# Patient Record
(demographics unavailable — no encounter records)

---

## 2019-10-18 NOTE — PDOC.LDHP
Labor and Delivery H&P


Chief complaint: other (PIH workup)


HPI: 





25 y/o G1 at 34w1d presents for PIH workup.  Patient reports mildly elevated 

BPs in clinic and a headache last night that has improved today.  Called clinic 

and was told to come in for evaluation.  Denies scotoma, RUQ pain, VB, LOF, ctx

, or decreased FM.





ROS neg for HEENT, CV, pulm, GI, , neuro, psych, skin, musculoskeletal, or 

constitutional symptoms other than mentioned above





OB: Dr. Squires


OB History Details: 





First pregnancy


Current pregnancy complications: pregestational diabetes


Past Medical History: 





Hypothyroid


Current medications: pre-wilda vitamins, other (metformin, synthroid)


Previous surgical history: none


Allergies/Adverse Reactions: 


 Allergies











Allergy/AdvReac Type Severity Reaction Status Date / Time


 


ciprofloxacin [From Cipro] Allergy  Nausea Verified 10/18/19 12:16











Social history: none





- Physical Exam


Vital signs reviewed and normal: yes


General: NAD, resting


Lungs: nonlabored breathing


Abdomen: gravid


Extremeties: no edema


FHT: category 1 (145, mod variability, + accels, single variable deceleration)


[place] contractions every: occasional





- Assessment





25 y/o G1 at 34w1d with no e/p preeclampsia.  Labs wnl. HA improved with 

Tylenol.  Fetal status reassuring with reactive NST.  





- Plan


-: 





D/c home with precautions.  Advised to follow up in clinic next week.